# Patient Record
Sex: FEMALE | Race: WHITE | Employment: FULL TIME | URBAN - METROPOLITAN AREA
[De-identification: names, ages, dates, MRNs, and addresses within clinical notes are randomized per-mention and may not be internally consistent; named-entity substitution may affect disease eponyms.]

---

## 2021-01-08 ENCOUNTER — INITIAL CONSULT (OUTPATIENT)
Dept: SURGERY | Age: 61
End: 2021-01-08
Payer: COMMERCIAL

## 2021-01-08 VITALS — WEIGHT: 140 LBS | SYSTOLIC BLOOD PRESSURE: 117 MMHG | HEART RATE: 71 BPM | DIASTOLIC BLOOD PRESSURE: 75 MMHG

## 2021-01-08 DIAGNOSIS — I83.93 VARICOSE VEINS OF BOTH LOWER EXTREMITIES, UNSPECIFIED WHETHER COMPLICATED: Primary | ICD-10-CM

## 2021-01-08 DIAGNOSIS — I83.93 SPIDER VEINS OF BOTH LOWER EXTREMITIES: ICD-10-CM

## 2021-01-08 PROBLEM — J34.89 NASAL SEPTAL PERFORATION: Status: ACTIVE | Noted: 2017-04-19

## 2021-01-08 PROBLEM — J34.89 NASAL OBSTRUCTION: Status: ACTIVE | Noted: 2017-06-09

## 2021-01-08 PROBLEM — J32.4 CHRONIC PANSINUSITIS: Status: ACTIVE | Noted: 2017-04-19

## 2021-01-08 PROCEDURE — G8421 BMI NOT CALCULATED: HCPCS | Performed by: SURGERY

## 2021-01-08 PROCEDURE — 99204 OFFICE O/P NEW MOD 45 MIN: CPT | Performed by: SURGERY

## 2021-01-08 PROCEDURE — 1036F TOBACCO NON-USER: CPT | Performed by: SURGERY

## 2021-01-08 PROCEDURE — 3017F COLORECTAL CA SCREEN DOC REV: CPT | Performed by: SURGERY

## 2021-01-08 PROCEDURE — G8484 FLU IMMUNIZE NO ADMIN: HCPCS | Performed by: SURGERY

## 2021-01-08 PROCEDURE — G8427 DOCREV CUR MEDS BY ELIG CLIN: HCPCS | Performed by: SURGERY

## 2021-01-08 RX ORDER — CALCIUM CARBONATE 500(1250)
TABLET ORAL DAILY
COMMUNITY

## 2021-01-08 RX ORDER — BUPROPION HYDROCHLORIDE 300 MG/1
TABLET ORAL
COMMUNITY
Start: 2020-11-24

## 2021-01-08 RX ORDER — ATORVASTATIN CALCIUM 10 MG/1
10 TABLET, FILM COATED ORAL DAILY
COMMUNITY

## 2021-01-08 RX ORDER — ESTERIFIED ESTROGEN AND METHYLTESTOSTERONE 1.25; 2.5 MG/1; MG/1
TABLET ORAL
COMMUNITY
Start: 2020-12-28

## 2021-01-08 RX ORDER — LISINOPRIL AND HYDROCHLOROTHIAZIDE 12.5; 1 MG/1; MG/1
TABLET ORAL
COMMUNITY
Start: 2020-12-28

## 2021-01-08 RX ORDER — LEVOTHYROXINE SODIUM 0.12 MG/1
125 TABLET ORAL DAILY
COMMUNITY

## 2021-01-08 RX ORDER — IBUPROFEN 800 MG/1
TABLET ORAL
COMMUNITY
Start: 2020-11-25

## 2021-01-08 RX ORDER — ALPRAZOLAM 0.5 MG/1
0.5 TABLET ORAL NIGHTLY PRN
COMMUNITY

## 2021-01-08 RX ORDER — CONJUGATED ESTROGENS 0.62 MG/G
CREAM VAGINAL
COMMUNITY
Start: 2020-11-30

## 2021-01-08 RX ORDER — ATENOLOL 50 MG/1
TABLET ORAL
COMMUNITY
Start: 2020-11-22

## 2021-01-08 RX ORDER — MEDROXYPROGESTERONE ACETATE 5 MG/1
TABLET ORAL
COMMUNITY
Start: 2020-12-26

## 2021-01-08 RX ORDER — CETIRIZINE HYDROCHLORIDE 10 MG/1
10 TABLET ORAL DAILY
COMMUNITY

## 2021-01-08 ASSESSMENT — ENCOUNTER SYMPTOMS
ALLERGIC/IMMUNOLOGIC NEGATIVE: 1
EYES NEGATIVE: 1
RESPIRATORY NEGATIVE: 1
GASTROINTESTINAL NEGATIVE: 1
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
## 2021-01-08 NOTE — PATIENT INSTRUCTIONS
Cost of sclerotherapy is $500 a session. Bring your compression stockings with you.  You are scheduled for Friday, February, 5, 2021 at 3 pm.

## 2021-02-05 ENCOUNTER — OFFICE VISIT (OUTPATIENT)
Dept: SURGERY | Age: 61
End: 2021-02-05

## 2021-02-05 VITALS
HEIGHT: 68 IN | WEIGHT: 139 LBS | SYSTOLIC BLOOD PRESSURE: 100 MMHG | BODY MASS INDEX: 21.07 KG/M2 | DIASTOLIC BLOOD PRESSURE: 67 MMHG

## 2021-02-05 DIAGNOSIS — I83.93 SPIDER VEINS OF BOTH LOWER EXTREMITIES: Primary | ICD-10-CM

## 2021-02-05 PROCEDURE — 99024 POSTOP FOLLOW-UP VISIT: CPT | Performed by: SURGERY

## 2021-02-05 NOTE — PROGRESS NOTES
Sclerotherapy performed on both legs. 6 syringes used, under one hour. Patient tolerated procedure well, no adverse reactions. Cotton balls, tape, and pantyhose/stockings applied. Patient will follow up in 3 weeks.   Patient had excellent initial results with spider veins on both feet ankles thigh calves done in the prone and supine positions on the posterior calves some reticular veins were injected 6 syringes of foamed polidocanol used with 30-gauge needles

## 2021-02-26 ENCOUNTER — OFFICE VISIT (OUTPATIENT)
Dept: SURGERY | Age: 61
End: 2021-02-26

## 2021-02-26 VITALS — DIASTOLIC BLOOD PRESSURE: 67 MMHG | HEIGHT: 68 IN | SYSTOLIC BLOOD PRESSURE: 111 MMHG | BODY MASS INDEX: 21.45 KG/M2

## 2021-02-26 DIAGNOSIS — I83.93 SPIDER VEINS OF BOTH LOWER EXTREMITIES: Primary | ICD-10-CM

## 2021-02-26 PROCEDURE — 99024 POSTOP FOLLOW-UP VISIT: CPT | Performed by: SURGERY

## 2021-02-26 ASSESSMENT — ENCOUNTER SYMPTOMS
ALLERGIC/IMMUNOLOGIC NEGATIVE: 1
RESPIRATORY NEGATIVE: 1
GASTROINTESTINAL NEGATIVE: 1
EYES NEGATIVE: 1
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
## 2021-03-10 ENCOUNTER — HOSPITAL ENCOUNTER (OUTPATIENT)
Dept: WOMENS IMAGING | Age: 61
Discharge: HOME | End: 2021-03-10
Payer: COMMERCIAL

## 2021-03-10 DIAGNOSIS — Z13.820 SPECIAL SCREENING FOR OSTEOPOROSIS: ICD-10-CM

## 2021-03-10 PROCEDURE — 77080 DXA BONE DENSITY AXIAL: CPT

## 2025-01-24 ENCOUNTER — TELEPHONE (OUTPATIENT)
Dept: VASCULAR SURGERY | Age: 65
End: 2025-01-24

## 2025-01-27 NOTE — TELEPHONE ENCOUNTER
I spoke with Ms. Gonzalezyd who wanted to know if she needed compression stockings after sclerotherapy.  (She does).  She thought her appointment on Friday was for sclero, but she needs to see Dr. Thorpe first as she has not been seen in the office since 2021 and would now be considered a new patient.

## 2025-01-31 ENCOUNTER — OFFICE VISIT (OUTPATIENT)
Dept: VASCULAR SURGERY | Age: 65
End: 2025-01-31
Payer: COMMERCIAL

## 2025-01-31 VITALS
HEART RATE: 72 BPM | BODY MASS INDEX: 22.24 KG/M2 | SYSTOLIC BLOOD PRESSURE: 138 MMHG | WEIGHT: 144.1 LBS | DIASTOLIC BLOOD PRESSURE: 79 MMHG

## 2025-01-31 DIAGNOSIS — I83.93 SPIDER VEINS OF BOTH LOWER EXTREMITIES: ICD-10-CM

## 2025-01-31 DIAGNOSIS — I10 PRIMARY HYPERTENSION: Primary | ICD-10-CM

## 2025-01-31 PROCEDURE — 3017F COLORECTAL CA SCREEN DOC REV: CPT | Performed by: SURGERY

## 2025-01-31 PROCEDURE — 1036F TOBACCO NON-USER: CPT | Performed by: SURGERY

## 2025-01-31 PROCEDURE — 3075F SYST BP GE 130 - 139MM HG: CPT | Performed by: SURGERY

## 2025-01-31 PROCEDURE — 3078F DIAST BP <80 MM HG: CPT | Performed by: SURGERY

## 2025-01-31 PROCEDURE — G8427 DOCREV CUR MEDS BY ELIG CLIN: HCPCS | Performed by: SURGERY

## 2025-01-31 PROCEDURE — 99203 OFFICE O/P NEW LOW 30 MIN: CPT | Performed by: SURGERY

## 2025-01-31 PROCEDURE — G8420 CALC BMI NORM PARAMETERS: HCPCS | Performed by: SURGERY

## 2025-01-31 ASSESSMENT — ENCOUNTER SYMPTOMS
RESPIRATORY NEGATIVE: 1
EYES NEGATIVE: 1
ALLERGIC/IMMUNOLOGIC NEGATIVE: 1
GASTROINTESTINAL NEGATIVE: 1

## 2025-01-31 NOTE — PROGRESS NOTES
scribed by the Medical Assistant Martha Cummins in my presence and it is both accurate and complete.      Electronically signed by Brendan Thorpe MD on 1/31/2025 at 10:44 AM

## 2025-01-31 NOTE — PATIENT INSTRUCTIONS
You are scheduled for sclerotherapy on 2/28/25 at 1:00. Please bring your stockings with you as well as $500 for payment.

## 2025-02-28 ENCOUNTER — PROCEDURE VISIT (OUTPATIENT)
Dept: VASCULAR SURGERY | Age: 65
End: 2025-02-28

## 2025-02-28 DIAGNOSIS — I83.93 SPIDER VEINS OF BOTH LOWER EXTREMITIES: Primary | ICD-10-CM

## 2025-02-28 PROCEDURE — G2211 COMPLEX E/M VISIT ADD ON: HCPCS | Performed by: SURGERY

## 2025-02-28 PROCEDURE — 99212 OFFICE O/P EST SF 10 MIN: CPT | Performed by: SURGERY

## 2025-02-28 NOTE — PATIENT INSTRUCTIONS
Instructions after sclerotherapy:    Keep the cotton sponges and tape on for the next two days.  When it is time to remove them, you may find it is easier to remove them in the shower as the water may facilitate removing the tape.     Wear your stockings! This will help your legs heal faster.     Return in eight weeks just to have your legs checked.      You may still see areas on your legs that don't appear to be gone/healed.  This is normal. Healing/absorbing of the spider veins can take several months.     Normal exercise will not hurt your legs, or the sclerotherapy.

## 2025-02-28 NOTE — PROGRESS NOTES
next two days.  When it is time to remove them, you may find it is easier to remove them in the shower as the water may facilitate removing the tape.     Wear your stockings! This will help your legs heal faster.     Return in eight weeks just to have your legs checked.      You may still see areas on your legs that don't appear to be gone/healed.  This is normal. Healing/absorbing of the spider veins can take several months.     Normal exercise will not hurt your legs, or the sclerotherapy.    DENZEL Cummins MA am scribing for and in the presence of Brendan Thorpe MD on this date of 02/28/25    I Brendan Thorpe MD personally performed the services described in this documentation as scribed by the Medical Assistant Martha Cummins in my presence and it is both accurate and complete.      Electronically signed by Brendan Thorpe MD on 2/28/2025 at 2:03 PM